# Patient Record
Sex: MALE | Race: WHITE | Employment: FULL TIME | URBAN - METROPOLITAN AREA
[De-identification: names, ages, dates, MRNs, and addresses within clinical notes are randomized per-mention and may not be internally consistent; named-entity substitution may affect disease eponyms.]

---

## 2017-09-21 ENCOUNTER — OFFICE VISIT - HEALTHEAST (OUTPATIENT)
Dept: FAMILY MEDICINE | Facility: CLINIC | Age: 27
End: 2017-09-21

## 2017-09-21 DIAGNOSIS — J45.20 INTERMITTENT ASTHMA, UNCOMPLICATED: ICD-10-CM

## 2017-11-02 ENCOUNTER — RECORDS - HEALTHEAST (OUTPATIENT)
Dept: ADMINISTRATIVE | Facility: OTHER | Age: 27
End: 2017-11-02

## 2017-11-02 ENCOUNTER — AMBULATORY - HEALTHEAST (OUTPATIENT)
Dept: FAMILY MEDICINE | Facility: CLINIC | Age: 27
End: 2017-11-02

## 2017-11-02 ENCOUNTER — OFFICE VISIT - HEALTHEAST (OUTPATIENT)
Dept: FAMILY MEDICINE | Facility: CLINIC | Age: 27
End: 2017-11-02

## 2017-11-02 DIAGNOSIS — Z23 NEED FOR INFLUENZA VACCINATION: ICD-10-CM

## 2017-11-02 DIAGNOSIS — J02.0 STREP PHARYNGITIS: ICD-10-CM

## 2017-11-02 DIAGNOSIS — J02.9 SORE THROAT: ICD-10-CM

## 2018-07-31 ENCOUNTER — OFFICE VISIT - HEALTHEAST (OUTPATIENT)
Dept: FAMILY MEDICINE | Facility: CLINIC | Age: 28
End: 2018-07-31

## 2018-07-31 DIAGNOSIS — R10.32 BILATERAL GROIN PAIN: ICD-10-CM

## 2018-07-31 DIAGNOSIS — R10.31 BILATERAL GROIN PAIN: ICD-10-CM

## 2018-10-20 ENCOUNTER — COMMUNICATION - HEALTHEAST (OUTPATIENT)
Dept: FAMILY MEDICINE | Facility: CLINIC | Age: 28
End: 2018-10-20

## 2018-11-13 ENCOUNTER — OFFICE VISIT - HEALTHEAST (OUTPATIENT)
Dept: FAMILY MEDICINE | Facility: CLINIC | Age: 28
End: 2018-11-13

## 2018-11-13 DIAGNOSIS — J45.20 INTERMITTENT ASTHMA, UNCOMPLICATED: ICD-10-CM

## 2019-11-08 ENCOUNTER — OFFICE VISIT - HEALTHEAST (OUTPATIENT)
Dept: FAMILY MEDICINE | Facility: CLINIC | Age: 29
End: 2019-11-08

## 2019-11-08 DIAGNOSIS — Z23 NEED FOR INFLUENZA VACCINATION: ICD-10-CM

## 2019-11-08 DIAGNOSIS — S89.92XA KNEE INJURY, LEFT, INITIAL ENCOUNTER: ICD-10-CM

## 2019-11-08 ASSESSMENT — MIFFLIN-ST. JEOR: SCORE: 1823.55

## 2020-01-23 ENCOUNTER — OFFICE VISIT - HEALTHEAST (OUTPATIENT)
Dept: FAMILY MEDICINE | Facility: CLINIC | Age: 30
End: 2020-01-23

## 2020-01-23 DIAGNOSIS — S39.012A LOW BACK STRAIN, INITIAL ENCOUNTER: ICD-10-CM
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
## 2020-02-25 ENCOUNTER — OFFICE VISIT - HEALTHEAST (OUTPATIENT)
Dept: FAMILY MEDICINE | Facility: CLINIC | Age: 30
End: 2020-02-25

## 2020-02-25 DIAGNOSIS — N50.812 LEFT TESTICULAR PAIN: ICD-10-CM

## 2020-02-25 DIAGNOSIS — I86.1 LEFT VARICOCELE: ICD-10-CM

## 2020-06-23 ENCOUNTER — COMMUNICATION - HEALTHEAST (OUTPATIENT)
Dept: FAMILY MEDICINE | Facility: CLINIC | Age: 30
End: 2020-06-23

## 2020-06-23 ENCOUNTER — OFFICE VISIT - HEALTHEAST (OUTPATIENT)
Dept: FAMILY MEDICINE | Facility: CLINIC | Age: 30
End: 2020-06-23

## 2020-06-23 DIAGNOSIS — J45.20 INTERMITTENT ASTHMA, UNCOMPLICATED: ICD-10-CM

## 2020-06-23 DIAGNOSIS — Z11.4 ENCOUNTER FOR SCREENING FOR HIV: ICD-10-CM

## 2020-06-23 RX ORDER — LORATADINE 10 MG/1
10 TABLET ORAL DAILY
Status: SHIPPED | COMMUNITY
Start: 2020-06-23

## 2020-06-23 RX ORDER — ALBUTEROL SULFATE 90 UG/1
AEROSOL, METERED RESPIRATORY (INHALATION)
Qty: 1 INHALER | Refills: 1 | Status: SHIPPED | OUTPATIENT
Start: 2020-06-23

## 2021-03-25 ENCOUNTER — COMMUNICATION - HEALTHEAST (OUTPATIENT)
Dept: FAMILY MEDICINE | Facility: CLINIC | Age: 31
End: 2021-03-25

## 2021-05-28 ASSESSMENT — ASTHMA QUESTIONNAIRES
ACT_TOTALSCORE: 22
ACT_TOTALSCORE: 22

## 2021-05-31 VITALS — BODY MASS INDEX: 27.87 KG/M2 | WEIGHT: 192.5 LBS

## 2021-05-31 VITALS — WEIGHT: 192 LBS | BODY MASS INDEX: 27.8 KG/M2

## 2021-06-01 VITALS — BODY MASS INDEX: 27.07 KG/M2 | WEIGHT: 187 LBS

## 2021-06-02 VITALS — WEIGHT: 193.25 LBS | BODY MASS INDEX: 27.98 KG/M2

## 2021-06-03 VITALS
HEIGHT: 70 IN | BODY MASS INDEX: 27.06 KG/M2 | WEIGHT: 189 LBS | OXYGEN SATURATION: 98 % | HEART RATE: 68 BPM | RESPIRATION RATE: 16 BRPM

## 2021-06-03 NOTE — PROGRESS NOTES
Impression:  Left knee ligament laxity, rule out ACL and lateral collateral ligament injuries or meniscal injury    Plan:  Refer to orthopedics, avoid traumatic exercise but he can do cycling for exercise until then, quadriceps strengthening exercises, I do not think he needs crutches or brace at this point appears to be a chronic situation      Chief Complaint:  Chief Complaint   Patient presents with     Knee Pain         HPI:   Dami Rojas is a 29 y.o. male who presents to this clinic for the evaluation of a feeling of instability in his left knee.  Patient has noticed over the past several days that when he tries to fully extend his left knee, it feels unstable.  He does not recall any specific injury but he is active in several sports including rockclimbing and playing football.  No swelling or pain in the knee.  No other joint complaints    PMH:   No past medical history on file.  Past Surgical History:   Procedure Laterality Date     OSTEOCHONDROMA EXCISION  2008         ROS:  All other systems negative    Meds:    Current Outpatient Medications:      albuterol (PROAIR HFA) 90 mcg/actuation inhaler, INHALE ONE OR TWO PUFFS BY MOUTH EVERY FOUR TO SIX HOURS AS NEEDED ., Disp: 1 Inhaler, Rfl: 11     FEXOFENADINE HCL (ALLEGRA ORAL), Take 1 tablet by mouth daily. , Disp: , Rfl:         Social:  Social History     Socioeconomic History     Marital status: Single     Spouse name: Not on file     Number of children: Not on file     Years of education: Not on file     Highest education level: Not on file   Occupational History     Not on file   Social Needs     Financial resource strain: Not on file     Food insecurity:     Worry: Not on file     Inability: Not on file     Transportation needs:     Medical: Not on file     Non-medical: Not on file   Tobacco Use     Smoking status: Never Smoker     Smokeless tobacco: Never Used   Substance and Sexual Activity     Alcohol use: Yes     Drinks per session: 1 or 2      Comment: Moderately     Drug use: No     Sexual activity: Yes     Partners: Female     Birth control/protection: OCP   Lifestyle     Physical activity:     Days per week: Not on file     Minutes per session: Not on file     Stress: Not on file   Relationships     Social connections:     Talks on phone: Not on file     Gets together: Not on file     Attends Taoism service: Not on file     Active member of club or organization: Not on file     Attends meetings of clubs or organizations: Not on file     Relationship status: Not on file     Intimate partner violence:     Fear of current or ex partner: Not on file     Emotionally abused: Not on file     Physically abused: Not on file     Forced sexual activity: Not on file   Other Topics Concern     Not on file   Social History Narrative     Not on file         Physical Exam:  Left knee shows bony prominence of the tibial tuberosity.  There is no effusion and there is normal range of motion.  There is ligament laxity on Lachman testing of the ACL and on varus stress testing of the lateral collateral ligament of the knee.  The MCL is stable on valgus stress testing.  Right knee is normal    Results:    No results found for this or any previous visit (from the past 24 hour(s)).    No results found.      Jose Brown MD

## 2021-06-04 VITALS
BODY MASS INDEX: 27.84 KG/M2 | DIASTOLIC BLOOD PRESSURE: 76 MMHG | HEART RATE: 52 BPM | RESPIRATION RATE: 12 BRPM | TEMPERATURE: 98.1 F | OXYGEN SATURATION: 98 % | SYSTOLIC BLOOD PRESSURE: 131 MMHG | WEIGHT: 194 LBS

## 2021-06-04 VITALS
BODY MASS INDEX: 26.93 KG/M2 | HEART RATE: 59 BPM | DIASTOLIC BLOOD PRESSURE: 76 MMHG | TEMPERATURE: 97.9 F | SYSTOLIC BLOOD PRESSURE: 132 MMHG | OXYGEN SATURATION: 99 % | WEIGHT: 187.7 LBS

## 2021-06-09 NOTE — PROGRESS NOTES
"Dami Rojas is a 29 y.o. male who is being evaluated via a billable telephone visit.      The patient has been notified of following:     \"This telephone visit will be conducted via a call between you and your physician/provider. We have found that certain health care needs can be provided without the need for a physical exam.  This service lets us provide the care you need with a short phone conversation.  If a prescription is necessary we can send it directly to your pharmacy.  If lab work is needed we can place an order for that and you can then stop by our lab to have the test done at a later time.    Telephone visits are billed at different rates depending on your insurance coverage. During this emergency period, for some insurers they may be billed the same as an in-person visit.  Please reach out to your insurance provider with any questions.    If during the course of the call the physician/provider feels a telephone visit is not appropriate, you will not be charged for this service.\"    Patient has given verbal consent to a Telephone visit? Yes         What phone number would you like to be contacted at? [phone]    Patient would like to receive their AVS by AVS Preference: Melani.    Chief Complaint   Patient presents with     Follow-up     medication-albuterol; current dose is working good.     Dami says he is generally doing well given the times.     He was to have surgery through [facility] for a varicocele, but this was cancelled because of Covid.  He says the varicocele has improved since he started wearing briefs. He understands his surgeons team will contact him again when they are ready to do elective surgeries    Asthma  - Dami mostly does not notice his asthma. Used Advair as a kid, but grew out of need for controller medication.  Currently uses albuterol maybe once a week only with vigorous exercise. If he comes home after a log run he might wheeze a little. Sometimes he'll take his " inhaler before he anticipates a long run. Allergies might contribute too - he notices symptoms more when he spends time in the [facility].     For allergies his switches between Allegra and loratadine yearly  He has never gotten tested for allergies, but he know that ragweed drives him crazy  ACT Total Score: 22 (6/23/2020  2:02 PM)    Objective: Dami sounds well, no dyspnea     Assessment/Plan:    1. Intermittent asthma, uncomplicated  Well controlled on:   - albuterol (PROAIR HFA) 90 mcg/actuation inhaler; INHALE ONE OR TWO PUFFS BY MOUTH EVERY FOUR TO SIX HOURS AS NEEDED  Dispense: 1 Inhaler; Refill: 1    2. Encounter for screening for HIV  - HIV Antigen/Antibody Screening [facility]; Standing    Also   - will let me know if he needs a preop   - Sent link to [facility]    Return in about 1 year (around 6/23/2021) for or earlier as needed.      Phone call duration: 10 minutes  2:14 - 2:24    Gena Parks MD

## 2021-06-13 NOTE — PROGRESS NOTES
"Assessment and Plan    1. Intermittent asthma, uncomplicated  Well controlled  - albuterol (PROAIR HFA) 90 mcg/actuation inhaler; INHALE ONE OR TWO PUFFS BY MOUTH EVERY FOUR TO SIX HOURS AS NEEDED  Dispense: 1 Inhaler; Refill: 11  - montelukast (SINGULAIR) 10 mg tablet; Take 1 tablet (10 mg total) by mouth at bedtime.  Dispense: 90 tablet; Refill: 3    Gena Parks MD     -------------------------------------------    Chief Complaint   Patient presents with     Asthma     needs meds to be refilled.     Dami needs his Singulair and albuterol refilled. In the winter the allergies aren't bad - would only take Singulair before exercise.  When it's allergy season Singulair helps more the allergies, and he doesn't take a typical non-sedating antihistamine. Typically only takes albuterol once a week, before or after exercise.  He does not feel short of breath with usual activities, only with more intense physical exercise.  At one point he was on Advair but it was \"really expensive so I stopped;\" - he does not seem to need it.  He has never had to use albuterol more than once daily    He is training now - longest run is 8 miles.  He is affected by the quality alerts - these bring him more sinus drainage and mucous rather than dyspnea    In the past 4 weeks, how much of the time did your asthma keep you from getting as much done at work, school, or at home?: 5 (9/21/2017 11:00 AM)  During the past 4 weeks, how often have you had shortness of breath?: 4 (9/21/2017 11:00 AM)  During the past 4 weeks, how often did your asthma symptoms (wheezing, coughing, shortness of breath, chest tightness or pain) wake you up at night or earlier in the morning?: 5 (9/21/2017 11:00 AM)  During the past 4 weeks, how often have you used your rescue inhaler or nebulizer medication (such as albuterol)?: 4 (9/21/2017 11:00 AM)  How would you rate your asthma control during the past 4 weeks?: 5 (9/21/2017 11:00 AM)  ACT Total Score: 23 " (9/21/2017 11:00 AM)  In the past 12 months, have you visited the emergency room due to your asthma?: No (9/21/2017 11:00 AM)  In the past 12 months, have you been hospitalized due to your asthma?: No (9/21/2017 11:00 AM)      Patient Active Problem List   Diagnosis     Allergic Rhinitis     Intermittent asthma       Current Outpatient Prescriptions on File Prior to Visit   Medication Sig Dispense Refill     FEXOFENADINE HCL (ALLEGRA ORAL) Take 1 tablet by mouth daily.        mometasone (ASMANEX HFA) 200 mcg/actuation HFAA Inhale 400 mcg 2 (two) times a day. 1 Inhaler 0     No current facility-administered medications on file prior to visit.            Health Maintenance Due   Topic Date Due     INFLUENZA VACCINE RULE BASED (1) 08/01/2017     Health Maintenance reviewed - he will get this elsewhere    History   Smoking Status     Never Smoker   Smokeless Tobacco     Never Used       History   Alcohol Use     Yes     Comment: Moderately       Review of Systems - feeling well overall    Vitals:    09/21/17 1058   BP: 112/78   Pulse: (!) 48   Resp: 16   SpO2: 98%     Body mass index is 27.8 kg/(m^2).     EXAM:    General appearance - alert, well appearing, and in no distress  Chest - clear to auscultation, no wheezes, rales or rhonchi, symmetric air entry  Heart - normal rate, regular rhythm, normal S1, S2, no murmurs, rubs, clicks or gallops

## 2021-06-13 NOTE — PROGRESS NOTES
Assessment & Plan   1. Strep pharyngitis:  Positive strep screen. Allergy to cephalexin though has previously tolerated penicillins. Advised to avoid contact with others, complete full course of antibiotic.  He inquires about going for a run tomorrow and I see no reason he cannot do this if cough is not worsening and no wheezing.  Listen to body and rest as needed  - Rapid Strep A Screen-Throat  - amoxicillin (AMOXIL) 500 MG tablet; Take 1 tablet (500 mg total) by mouth 2 (two) times a day for 10 days. May substitute capsules  Dispense: 20 tablet; Refill: 0    2. Need for influenza vaccination  - Influenza, Seasonal Quad, Preservative Free 36+ Months    Natasha Daniel, KARMA    Subjective   Chief Complaint:  Sore Throat (sore throat 2x days ); Nasal Congestion; and Cough    HPI:   Dami Rojas is a 27 y.o. male who presents for evaluation of sore throat, cough.    He is a patient of Dr. Parks.  PMH notable for intermittent asthma.     He complains of sore throat and nasal congestion x2 days.  Cough that just began this morning.  He states he has a workmate who has been ill with strep throat.  Cough is dry, mild.  He denies wheezing or shortness of breath. On mometasone and Singulair for asthma.      PMH:   Patient Active Problem List   Diagnosis     Allergic Rhinitis     Intermittent asthma       No past medical history on file.    Current Medications:   Current Outpatient Prescriptions on File Prior to Visit   Medication Sig Dispense Refill     albuterol (PROAIR HFA) 90 mcg/actuation inhaler INHALE ONE OR TWO PUFFS BY MOUTH EVERY FOUR TO SIX HOURS AS NEEDED 1 Inhaler 11     FEXOFENADINE HCL (ALLEGRA ORAL) Take 1 tablet by mouth daily.        mometasone (ASMANEX HFA) 200 mcg/actuation HFAA Inhale 400 mcg 2 (two) times a day. 1 Inhaler 0     montelukast (SINGULAIR) 10 mg tablet Take 1 tablet (10 mg total) by mouth at bedtime. 90 tablet 3     No current facility-administered medications on file prior to visit.         Allergies:  is allergic to cephalexin.    SH/FH:  Social History and Family History reviewed and updated.   Tobacco Status:  He  reports that he has never smoked. He has never used smokeless tobacco.    Review of Systems:  A complete head to toe ROS is negative unless otherwise noted in HPI    Objective     Vitals:    11/02/17 1321   BP: 138/74   Patient Site: Left Arm   Patient Position: Sitting   Cuff Size: Adult Regular   Pulse: (!) 59   Resp: 16   Temp: 99.2  F (37.3  C)   TempSrc: Oral   SpO2: 98%   Weight: 192 lb 8 oz (87.3 kg)     Wt Readings from Last 3 Encounters:   11/02/17 192 lb 8 oz (87.3 kg)   09/21/17 192 lb (87.1 kg)   06/17/16 188 lb 12 oz (85.6 kg)       Physical Exam:  GENERAL: Alert, well-appearing male  EYES: Conjunctiva pink, sclera white, no exudates.   EARS: TMs pearly grey, no bulging, redness, retraction.   NOSE: Nares patent, clear discharge  MOUTH: Pharynx moist, pink without exudate. No tonsillar enlargement  NECK: No lymphadenopathy.   CV: Regular rate and rhythm without murmurs, rubs or gallops.  RESP: Lung sounds clear, no wheezing or rhonchi    Labs:    No results found for this or any previous visit (from the past 168 hour(s)).

## 2021-06-16 PROBLEM — Z86.79 HISTORY OF VARICOCELE: Status: ACTIVE | Noted: 2020-04-19
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
## 2021-06-17 NOTE — PATIENT INSTRUCTIONS - HE
Patient Instructions by Jose Brown MD at 11/8/2019  2:20 PM     Author: Jose Brown MD Service: -- Author Type: Physician    Filed: 11/8/2019  3:25 PM Encounter Date: 11/8/2019 Status: Signed    : Jose Brown MD (Physician)         Patient Education     ACL and PCL (Knee Ligament Injury)  Follow up with the referral doctor or healthcare provider, or as directed.  If an X-ray, CT scan, or MRI scan was done today, you will be notified of any new findings that may affect your care.  The knee is a hinge joint. It joins the thigh and shin bones. There are 2 ligaments inside the knee that protect the joint from too much forward and backward movement. The ACL, or anterior cruciate ligament, keeps the knee from sliding forward. The PCL, or posterior cruciate ligament, keeps the knee from sliding backward.  An ACL or PCL injury occurs when the ligament has been torn. The tear may be partial or complete. Symptoms include knee swelling, pain, and the joint becoming unstable. Some people hear a snap or pop at the time of an ACL injury.  ACL injuries are the most common. They often occur during quick changes in direction while playing sports. An ACL injury may happen at the same time as a cartilage injury of the knee.  The less common PCL injury occurs if the knee bends backwards, or with a direct blow to a bent knee (such as hitting the dashboard with your knee during a car accident).  The pain and swelling of a cruciate ligament injury can last several weeks. It may take 3 to 4 months for the ligament to fully heal.  An ACL or PCL diagnosis can often be made by physical exam. But pain and swelling right after the injury may limit the exam. An MRI may be used to confirm the diagnosis.  Early treatment includes resting the joint, wearing a splint to reduce movement, and using ice to reduce swelling and pain. You may use over-the-counter pain medicine, unless another pain medicine was prescribed.  Physical therapy will help you to regain joint and leg strength. Surgery is sometimes needed to treat a more severe cruciate ligament injury.     Home care    Stay off the injured leg as much as possible until you can walk on it without pain. If you have a lot of pain with walking, crutches or a walker may be prescribed. These can be rented or purchased at many pharmacies and surgical or orthopedic supply stores. Follow your doctor's advice about when to begin bearing weight on that leg.    Keep your leg raised to reduce pain and swelling. When sleeping, place a pillow under the injured leg. When sitting, support the injured leg so it is level with your waist. This is very important during the first 48 hours.    Apply an ice pack over the injured area for 15 to 20 minutes. Do this every 3 to 6 hours on the first day for pain relief. Keep using ice 3 to 4 times a day until the pain and swelling goes away. To make an ice pack, put ice cubes in a plastic bag that seals at the top. Then wrap the bag in a clean, thin towel or cloth. You can place the ice pack directly over a splint. As the ice melts, be careful that the splint doesnt get wet. If you have a hook-and-loop knee immobilizer, you can open this to put the ice pack directly to the knee. But always wrap the ice pack in a towel or cloth first. Never put it directly on your skin.    If you were given a plaster or fiberglass splint, keep it dry at all times. Bathe with your splint out of the water, protected with a large plastic bag. Seal the top end with a rubber band. If a fiberglass splint gets wet, you can dry it with a hair dryer on a cool setting. If you have a hook-and-loop fastening knee immobilizer, you can remove this to bathe, unless told otherwise.    Your provider may prescribe physical therapy. The physical therapist will show you range of motion exercises to practice at home.    You may use over-the-counter pain medicine to control pain, unless  another medicine was prescribed. If you have chronic liver or kidney disease or ever had a stomach ulcer or GI (gastrointestinal) bleeding, talk with your provider before using these medicines.    Learn proper techniques for playing sports or exercising. Training programs can teach athletes how to reduce stress to the ACL. Protective bracing during sports may be helpful to prevent re-injury.    Check with your provider before returning to sports or full work duties.  Follow-up care  Follow up with the referral doctor or healthcare provider, or as advised.  If an X-ray, CT scan, or MRI scan was done today, you will be notified of any new findings that may affect your care.  When to seek medical advice  Call your healthcare provider right away if any of these occur:    Pain or swelling becomes worse    Swelling, redness, or pain develop in the calf or thigh    The knee becomes more unstable, giving out often or locking up  Call 911  Call 911 if you have:     Chest pain     Shortness of breath, with or without chest pain or discomfort    Weakness or numbness in the arm, leg, or face.  Especially if it is only on 1 side    Sudden confusion or trouble speaking    Sudden severe headache with no known cause  Date Last Reviewed: 11/24/2015 2000-2017 [employer]. [address] [place] [address]. All rights reserved. This information is not intended as a substitute for professional medical care. Always follow your healthcare professional's instructions.           Patient Education     Knee Pain with Possible Torn Meniscus    The meniscus is a tough cartilage pad that cushions the inside of the knee joint. It helps absorb the shock from movement. It also spreads the weight of your body evenly across the knee joint. This prevents excess wear and tear to the bones of that joint.  The most common causes of meniscal tears are injuries, especially related to sports and degenerative disease that happens  with aging.  A meniscus tear commonly happens during a twisting injury when the knee is bent. This causes pain, swelling, reduced movement of the knee, and trouble walking. There may be popping, clicking, joint locking or inability to completely straighten the knee. Ligaments of the knee may also be injured.  A torn meniscus is diagnosed by physical exam and X-rays. In the case of a severe injury, the knee may be too painful to examine fully. A more accurate exam can be done after the initial swelling goes down. An MRI may be ordered to make a final diagnosis.  If your healthcare provider suspects a meniscal injury, you will treat your knee with ice and rest and preventing movement of the knee. A splint or knee brace that keeps your leg straight may be put on to protect the joint. Depending on the severity of the injury, surgery may be needed. A cartilage injury may take 4-12 weeks to heal depending on how bad it is.  Home care    Stay off the injured leg as much as possible until you can walk on it without pain. If you have a lot of pain when walking, crutches or a walker may be prescribed. (These can be rented or bought at many pharmacies and surgical or orthopedic supply stores). Follow your healthcare provider's advice about when to begin putting weight on that leg.    Keep your leg elevated to reduce pain and swelling. When sleeping, place a pillow under the injured leg. When sitting, support the injured leg so it is level with your waist. This is very important during the first 48 hours.    Apply an ice pack over the injured area for 15 to 20 minutes every 3 to 6 hours. You should do this for the first 24 to 48 hours. You can make an ice pack by filling a plastic bag that seals at the top with ice cubes and then wrapping it with a thin towel. Continue to use ice packs for relief of pain and swelling as needed. As the ice melts, be careful to avoid getting your wrap, splint, or cast wet. After 48 hours, apply  heat (warm shower or warm bath) for 15 to 20 minutes several times a day, or alternate ice and heat. You can place the ice pack directly over the splint. If you have to wear a hook-and-loop knee brace, you can open it to apply the ice pack, or heat, directly to the knee. Never put ice directly on the skin. Always wrap the ice in a towel or other type of cloth.    You may use over-the-counter pain medicine to control pain, unless another pain medicine was prescribed. If you have chronic liver or kidney disease or ever had a stomach ulcer, talk with your healthcare provider before using these medicines.    If you were given a splint, keep it dry at all times. Bathe with your splint out of the water. Protect it with a large plastic bag that is rubber-banded at the top end. If a fiberglass splint gets wet, you can dry it with a hair dryer. If you have a hook-and-loop knee brace, you can remove this to bathe, unless told otherwise.    Check with your healthcare provider before returning to sports or full work duties.  Follow-up care  Follow up with your healthcare provider, or as advised. This is usually within 1 to 2 weeks. Further testing may be required to check the extent of your injury.  If X-rays were taken, you will be told of any new findings that may affect your care.  Call 911  Call 911 if you have:     Shortness of breath    Chest pain  When to seek medical advice  Call your healthcare provider right away if any of these occur:    Toes or foot gets swollen, cold, blue, numb, or tingly    Pain or swelling spreads over the knee or calf    Warmth or redness appears over the knee or calf    Fever of 100.4 F (38 C) or higher, or as directed by your healthcare provider  Date Last Reviewed: 11/23/2015 2000-2017 [employer]. [address] [place] [address], [place] [address]. All rights reserved. This information is not intended as a substitute for professional medical care. Always follow your healthcare  professional's instructions.

## 2021-06-18 NOTE — PATIENT INSTRUCTIONS - HE
Patient Instructions by Jose Malhotra PA-C at 2/25/2020  7:10 AM     Author: Jose Malhotra PA-C Service: -- Author Type: Physician Assistant    Filed: 2/25/2020  7:40 AM Encounter Date: 2/25/2020 Status: Addendum    : Jose Malhotra PA-C (Physician Assistant)    Related Notes: Original Note by Jose Malhotra PA-C (Physician Assistant) filed at 2/25/2020  7:38 AM       Go to the emergency room and be seen for evaluation and treatment.      Patient Education     Testicular Pain, Unclear Cause  You have had pain in one or both testicles. Based on your exam today, the exact cause of your pain is not certain. But your condition does not appear to be dangerous. Testicles are very sensitive. Even a small injury can cause quite a bit of pain. Other possible causes of testicular pain include kidney stones, cysts, mumps, inflammatory conditions, chronic conditions, hernia, infection, and a twisted testicle.  Certain tests may be done to rule out an underlying problem causing the pain. Nothing conclusive was found today. Most likely, the pain will go away on its own. If it doesnt, you may need more tests.    Home care  Medicine may be prescribed to help relieve pain and swelling. This may be an over-the-counter pain reliever or prescription pain medication. Take all medicine as directed.  The following are general care guidelines:    To relieve pain and swelling, apply an ice pack wrapped in a thin towel for 10 minutes at a time. Continue this on and off for 1 to 2 days.    When lying down, place a small rolled towel under your scrotum. When moving around, wear a jockstrap (athletic supporter) or supportive underwear. These will help support and protect your testicles.    If it hurts to walk, walk as little as possible until you feel better.    Avoid strenuous activity until you feel better.    Do not have sex until you feel better.    If you have severe pain in the testicle, seek care right away. Delay may lead to  permanent loss of the testicles function.  Follow-up care  Follow up with your healthcare provider, or as advised.  When to seek medical advice  Call your healthcare provider right away if any of these occur:    Fever of 100.4 F (38 C) or higher    Worsening of the pain or severe pain    Swelling of the testicle or scrotum    A lump in the scrotum    Warm and red scrotum (signs of infection)    Nausea and vomiting    Pain or swelling in abdomen    Trouble urinating    Numbness or weakness in the leg    Shrinking of the testicle    Blood in your urine  Date Last Reviewed: 10/1/2016    1121-5409 [employer]. [address] [place] [address]. All rights reserved. This information is not intended as a substitute for professional medical care. Always follow your healthcare professional's instructions.           Patient Education     Varicocele  A varicocele ([id number]) is a swelling in the veins above the testicles. It is similar to a varicose vein in the legs. The swelling happens when too much blood collects in the veins. It most often occurs around the left testicle. A varicocele may cause the sac of skin covering the testicles (the scrotum) to have a bluish color. It may also cause an achy or heavy feeling in the scrotum. The pain may be worse later in the day or after you have been standing for a long time. Some varicoceles can be seen all the time. Others can be seen only when you are standing. Or they may only be seen when a Valsalva maneuver is done. This means bearing down in your belly (abdomen) to increase pressure.  In most cases, a varicocele is not serious and doesn't need to be treated. But it is linked to being unable to have a child (infertility). If you and your partner are trying to have a baby, talk with your healthcare provider about your options.    No medicines are available to fix this condition. Surgery can be done to close off the enlarged veins. A varicocele is not  serious. And all surgery has risks. So you and your healthcare provider may consider surgery only if:    The pain becomes worse or you have new symptoms    The testicle shrinks (atrophy)    You want to try to improve your fertility  Home care  To help lessen discomfort, aching, or pain:    Wear a jockstrap or snug underwear    Take an over-the-counter pain reliever, such as ibuprofen  Follow-up care  Follow up with your healthcare provider, or as advised. Schedule regular exams with your provider so the varicocele can be watched. Be sure to keep all your appointments. Tell your provider if you notice any changes in your testicles or scrotum.   When to seek medical advice  Call your healthcare provider right away if any of these occur:    Increasing pain in your scrotum    Trouble urinating    A lump in the testicle    A bulge in the groin area appears or gets bigger  Date Last Reviewed: 6/1/2016 2000-2017 [employer]. [address] [place] [address], [place] [address]. All rights reserved. This information is not intended as a substitute for professional medical care. Always follow your healthcare professional's instructions.

## 2021-06-19 NOTE — PROGRESS NOTES
"  Assessment:       Groin pain, bilateral    Medical Decision Making  Pain is likely muscular irritation of the hip flexors due to marathon training. Initial concern for hernias ruled out by absence of palpable mass during physical exam with valsalva maneuver with no bowel movement difficulties, fevers, nausea, vomiting, or abdominal tenderness. Did instruct patient that there may be a small unidentifiable hernia and to monitor symptoms closely. If no improvement, may consider an ultrasound to verify no hernias.       Plan:       1. Provided reassurance  2. Rest and cold compresses  3. Discussed signs of worsening symptoms  4. Follow-up as needed       Patient Instructions   You were seen today for abdominal and groin pain. There were no signs of an infection or hernias present on exam today. Monitor for signs of worsening symptoms. Otherwise recommend rest and icing. May also use tylenol or ibuprofen.    Reasons to return for re-evaluation:  - Fever of 100.4 or higher  - Difficulties with urination or passing a bowel movement  - Worsening pain and discomfort  - Notice an abnormal bump    Subjective:       Dami Rojas is a 27 y.o. male here for evaluation of groin pain. Onset was 1 month ago. Patient describes pain in RLQ abdomen near the pelvic bone that hurts with running, described as 3/10 in severity, dull, and does not radiate. He also notes pain in the left groin that is the same 3/10 in severity, dull and \"pulling\" in character, and does not radiate. The left groin does not irritate during running. No history of abdominal surgeries. He denies fevers, nausea, vomiting, urinary symptoms such as frequency, dysuria, hematuria, and urethral discharge and troubles passing his bowel movements. Patient notes he has been training for a marathon.    The following portions of the patient's history were reviewed and updated as appropriate: allergies, current medications and problem list.    Review of Systems  Pertinent " items are noted in HPI.     Allergies  Allergies   Allergen Reactions     Cephalexin      Previously tolerated penicillins         Objective:       /78  Pulse (!) 48  Temp 98.2  F (36.8  C) (Oral)   Resp 12  Wt 187 lb (84.8 kg)  SpO2 (!) 47%  BMI 27.07 kg/m2  General appearance: alert, appears stated age, cooperative, no distress and non-toxic  Head: Normocephalic, without obvious abnormality, atraumatic  Back: no CVA tenderness  Lungs: clear to auscultation bilaterally and no rhonchi, rales, or wheezing  Heart: regular rate and rhythm, S1, S2 normal, no murmur, click, rub or gallop  Abdomen: soft, non-tender; bowel sounds normal; no masses,  no organomegaly  Male genitalia: tenderness to region between the left scrotum and inner thigh, no palpable masses, no masses when bearing down, no erythema, skin intact; varicocele of the left testicle, testicles non-tender, no urethral discharge  Extremities: extremities normal, atraumatic, no cyanosis or edema; FROM with negative obturator and psoas sign  Skin: Skin color, texture, turgor normal. No rashes or lesions

## 2021-06-20 ENCOUNTER — HEALTH MAINTENANCE LETTER (OUTPATIENT)
Age: 31
End: 2021-06-20

## 2021-06-20 NOTE — LETTER
Letter by Gena Parks MD at      Author: Gena Parks MD Service: -- Author Type: --    Filed:  Encounter Date: 6/23/2020 Status: (Other)       My Asthma Action Plan     Name: Dami Rojas   YOB: 1990  Date: 6/23/2020   My doctor: Gena Parks MD   My clinic: [facility]         My Rescue Medicine:   Albuterol (Proair/Ventolin/Proventil HFA) 2-4 puffs EVERY 4 HOURS as needed. Use a spacer if recommended by your provider.   My Asthma Severity:   Intermittent/Exercise Induced  Know your asthma triggers: exercise or sports             GREEN ZONE   Good Control    I feel good    No cough or wheeze    Can work, sleep and play without asthma symptoms     Take your asthma control medicine every day.     1. If exercise triggers your asthma, take your rescue medication    15 minutes before exercise or sports, and    During exercise if you have asthma symptoms  2. Spacer to use with inhaler: If you have a spacer, make sure to use it with your inhaler             YELLOW ZONE Getting Worse  I have ANY of these:    I do not feel good    Cough or wheeze    Chest feels tight    Wake up at night 1. Keep taking your Green Zone medications  2. Start taking your rescue medicine:    every 20 minutes for up to 1 hour. Then every 4 hours for 24-48 hours.  3. If you stay in the Yellow Zone for more than 12-24 hours, contact your doctor.  4. If you do not return to the Green Zone in 12-24 hours or you get worse, start taking your oral steroid medicine if prescribed by your provider.           RED ZONE Medical Alert - Get Help  I have ANY of these:    I feel awful    Medicine is not helping    Breathing getting harder    Trouble walking or talking    Nose opens wide to breathe     1. Take your rescue medicine NOW  2. If your provider has prescribed an oral steroid medicine, start taking it NOW  3. Call your doctor NOW  4. If you are still in the Red Zone after 20 minutes and you have not reached  your doctor:    Take your rescue medicine again and    Call 911 or go to the emergency room right away    See your regular doctor within 2 weeks of an Emergency Room or Urgent Care visit for follow-up treatment.          Annual Reminders:  Meet with Asthma Educator,  Flu Shot in the Fall, consider Pneumonia Vaccination for patients with asthma (aged 19 and older).    Pharmacy:   [facility] - [place], [place] - [address]  [address] [place] [address]  Phone: [phone] Fax: [phone]      Electronically signed by Gena Parks MD   Date: 06/23/20                      Asthma Triggers  How To Control Things That Make Your Asthma Worse    Triggers are things that make your asthma worse.  Look at the list below to help you find your triggers and what you can do about them.  You can help prevent asthma flare-ups by staying away from your triggers.      Trigger                                                          What you can do   Cigarette Smoke  Tobacco smoke can make asthma worse. Do not allow smoking in your home, car or around you.  Be sure no one smokes at a michelle day care or school.  If you smoke, ask your health care provider for ways to help you quit.  Ask family members to quit too.  Ask your health care provider for a referral to Quit Plan to help you quit smoking, or call 6-291-522-PLAN.     Colds, Flu, Bronchitis  These are common triggers of asthma. Wash your hands often.  Dont touch your eyes, nose or mouth.  Get a flu shot every year.     Dust Mites  These are tiny bugs that live in cloth or carpet. They are too small to see. Wash sheets and blankets in hot water every week.   Encase pillows and mattress in dust mite proof covers.  Avoid having carpet if you can. If you have carpet, vacuum weekly.   Use a dust mask and HEPA vacuum.   Pollen and Outdoor Mold  Some people are allergic to trees, grass, or weed pollen, or molds. Try to keep your windows  closed.  Limit time out doors when pollen count is high.   Ask you health care provider about taking medicine during allergy season.     Animal Dander  Some people are allergic to skin flakes, urine or saliva from pets with fur or feathers. Keep pets with fur or feathers out of your home.    If you cant keep the pet outdoors, then keep the pet out of your bedroom.  Keep the bedroom door closed.  Keep pets off cloth furniture and away from stuffed toys.     Mice, Rats, and Cockroaches  Some people are allergic to the waste from these pests.   Cover food and garbage.  Clean up spills and food crumbs.  Store grease in the refrigerator.   Keep food out of the bedroom.   Indoor Mold  This can be a trigger if your home has high moisture. Fix leaking faucets, pipes, or other sources of water.   Clean moldy surfaces.  Dehumidify basement if it is damp and smelly.   Smoke, Strong Odors, and Sprays  These can reduce air quality. Stay away from strong odors and sprays, such as perfume, powder, hair spray, paints, smoke incense, paint, cleaning products, candles and new carpet.   Exercise or Sports  Some people with asthma have this trigger. Be active!  Ask your doctor about taking medicine before sports or exercise to prevent symptoms.    Warm up for 5-10 minutes before and after sports or exercise.     Other Triggers of Asthma  Cold air:  Cover your nose and mouth with a scarf.  Sometimes laughing or crying can be a trigger.  Some medicines and food can trigger asthma.

## 2021-06-21 NOTE — PROGRESS NOTES
Assessment and Plan    1. Intermittent asthma, uncomplicated  Well controlled  - albuterol (PROAIR HFA) 90 mcg/actuation inhaler; INHALE ONE OR TWO PUFFS BY MOUTH EVERY FOUR TO SIX HOURS AS NEEDED .  Dispense: 1 Inhaler; Refill: 11    Orders Placed This Encounter   Procedures     Influenza, Seasonal Quad, Preservative Free 36+ Months       Return in about 1 year (around 11/13/2019).    Gena Parks MD     -------------------------------------------    Chief Complaint   Patient presents with     Follow-up     f/u med check      aDmi had tried Singulair for a year - felt like it didn't make a difference. He only gets symptoms when he is exercising in the cold.  If he doesn't take 2 puffs of albuterol, he can run but it's more annoying.  Sometimes he goes shortness of breath  if he has really bad allergies - both fall and spring - if he is in cabin somewhere and he forgets to take his antihistamine.       Used Advair as a kid, but grew out of need for controller medication.  Currently uses albuterol once a week only with vigorous exercise    Allergies - varies  - doesn't like zytrec - switched between claritin and allergra    Preventive   -No need for std testing   - OK for flu shot today      Patient Active Problem List   Diagnosis     Allergic Rhinitis     Intermittent asthma         Current Outpatient Medications:      albuterol (PROAIR HFA) 90 mcg/actuation inhaler, INHALE ONE OR TWO PUFFS BY MOUTH EVERY FOUR TO SIX HOURS AS NEEDED ., Disp: 1 Inhaler, Rfl: 11     FEXOFENADINE HCL (ALLEGRA ORAL), Take 1 tablet by mouth daily. , Disp: , Rfl:         There are no preventive care reminders to display for this patient.  Health Maintenance reviewed -     Social History     Tobacco Use   Smoking Status Never Smoker   Smokeless Tobacco Never Used       Social History     Substance and Sexual Activity   Alcohol Use Yes     Drinks per session: 1 or 2    Comment: Moderately         Vitals:    11/13/18 1349   BP: 138/72    Pulse: (!) 54   Resp: 16   SpO2: 98%     Body mass index is 27.98 kg/m .     EXAM:    General appearance - alert, well appearing, and in no distress  Mental status - normal mood, behavior, speech, dress, motor activity, and thought processes  Chest - clear to auscultation, no wheezes, rales or rhonchi, symmetric air entry  Heart - normal rate, regular rhythm, normal S1, S2, no murmurs, rubs, clicks or gallops

## 2021-06-28 NOTE — PROGRESS NOTES
Progress Notes by Jose Malhotra PA-C at 2/25/2020  7:10 AM     Author: Jose Malhotra PA-C Service: -- Author Type: Physician Assistant    Filed: 2/25/2020  8:26 AM Encounter Date: 2/25/2020 Status: Signed    : Jose Malhotra PA-C (Physician Assistant)       Subjective:      Patient ID: Dami Rojas is a 29 y.o. male.    Chief Complaint:    HPI     Dami Rojas is a 29 y.o. male with a longstanding chronic history of varicocele since age 15 who presents today complaining of left testicular pain since yesterday.  Patient did not have any precipitating event such as athletic activity running jumping or twisting bending or sexual intercourse last night.  Patient states that his left hemiscrotum has been more swollen than usual.  Is also more painful with movement or touching the scrotum or testicle.  He does not have signs or symptoms of hernia on the left side.  He has had no fever chills night sweats fatigue, he is  has one sexual partner and is not concerned for STDs.  Patient does not have pain at rest.  He says that his 1 out of 10 but it is a 7 out of 10 when he moves or touches the scrotum or testicle.  Additionally there is no urinary symptoms gross hematuria flank or back pain penile discharge, lesions on the scrotum or penis, arthralgias or fatigue.      No past medical history on file.    Past Surgical History:   Procedure Laterality Date   ? OSTEOCHONDROMA EXCISION  2008       Family History   Problem Relation Age of Onset   ? No Medical Problems Mother    ? Crohn's disease Father    ? Gallbladder disease Sister    ? Breast cancer Maternal Grandmother    ? No Medical Problems Paternal Grandmother    ? Lung disease Paternal Grandfather         work-related       Social History     Tobacco Use   ? Smoking status: Never Smoker   ? Smokeless tobacco: Never Used   Substance Use Topics   ? Alcohol use: Yes     Drinks per session: 1 or 2     Comment: Moderately   ? Drug use: No       Review of  Systems  As above in HPI, otherwise balance of Review of Systems are negative.    Objective:     /76 (Patient Site: Right Arm, Patient Position: Sitting, Cuff Size: Adult Regular)   Pulse (!) 59   Temp 97.9  F (36.6  C) (Oral)   Wt 187 lb 11.2 oz (85.1 kg)   SpO2 99%   BMI 26.93 kg/m      Physical Exam  General: Patient is uncomfortable when standing or moving  HEENT: Head is normocephalic atraumatic   eyes are PERRL EOMI sclera anicteric   LUNGS: Clear to auscultation bilaterally  HEART: Regular rate and rhythm  Skin: Without rash non-diaphoretic  : Normal circumcised phallus testicles are descended bilaterally the left side of the scrotum has a very large varicocele with a characteristic of a large bag of worms on the cord structure.  He also has tenderness over the epididymis and on the testicle itself.  There is a large palpated cyst or dilation from the varicocele.  Additionally the testicle itself is somewhat tender and there is pain on the scrotal wall with a small cyst.     Assessment:     Procedures    The primary encounter diagnosis was Left varicocele. A diagnosis of Left testicular pain was also pertinent to this visit.    Plan:     1. Left varicocele     2. Left testicular pain         Am concerned that the patient had significant pain that started last night.  It does not sound like a description of possible testicular torsion, however I do want him to have evaluation in a timely manner and he is being sent to the emergency room.  Patient was evaluated and referred to the emergency room very shortly after being roomed.  The longstanding chronic varicocele may be involved it may also be epididymitis or possibly testicular pain.  Raman will help to rule out other diagnoses or make a better diagnosis.  Patient will present to the emergency room for definitive evaluation and treatment.  Called and gave report to emergency room staff.    Patient Instructions   Go to the emergency room and be  seen for evaluation and treatment.      Patient Education     Testicular Pain, Unclear Cause  You have had pain in one or both testicles. Based on your exam today, the exact cause of your pain is not certain. But your condition does not appear to be dangerous. Testicles are very sensitive. Even a small injury can cause quite a bit of pain. Other possible causes of testicular pain include kidney stones, cysts, mumps, inflammatory conditions, chronic conditions, hernia, infection, and a twisted testicle.  Certain tests may be done to rule out an underlying problem causing the pain. Nothing conclusive was found today. Most likely, the pain will go away on its own. If it doesnt, you may need more tests.    Home care  Medicine may be prescribed to help relieve pain and swelling. This may be an over-the-counter pain reliever or prescription pain medication. Take all medicine as directed.  The following are general care guidelines:    To relieve pain and swelling, apply an ice pack wrapped in a thin towel for 10 minutes at a time. Continue this on and off for 1 to 2 days.    When lying down, place a small rolled towel under your scrotum. When moving around, wear a jockstrap (athletic supporter) or supportive underwear. These will help support and protect your testicles.    If it hurts to walk, walk as little as possible until you feel better.    Avoid strenuous activity until you feel better.    Do not have sex until you feel better.    If you have severe pain in the testicle, seek care right away. Delay may lead to permanent loss of the testicles function.  Follow-up care  Follow up with your healthcare provider, or as advised.  When to seek medical advice  Call your healthcare provider right away if any of these occur:    Fever of 100.4 F (38 C) or higher    Worsening of the pain or severe pain    Swelling of the testicle or scrotum    A lump in the scrotum    Warm and red scrotum (signs of infection)    Nausea and  vomiting    Pain or swelling in abdomen    Trouble urinating    Numbness or weakness in the leg    Shrinking of the testicle    Blood in your urine  Date Last Reviewed: 10/1/2016    8578-1220 [employer]. [address] [place] [id number]. All rights reserved. This information is not intended as a substitute for professional medical care. Always follow your healthcare professional's instructions.           Patient Education     Varicocele  A varicocele (HHM-tb-koa-seel) is a swelling in the veins above the testicles. It is similar to a varicose vein in the legs. The swelling happens when too much blood collects in the veins. It most often occurs around the left testicle. A varicocele may cause the sac of skin covering the testicles (the scrotum) to have a bluish color. It may also cause an achy or heavy feeling in the scrotum. The pain may be worse later in the day or after you have been standing for a long time. Some varicoceles can be seen all the time. Others can be seen only when you are standing. Or they may only be seen when a Valsalva maneuver is done. This means bearing down in your belly (abdomen) to increase pressure.  In most cases, a varicocele is not serious and doesn't need to be treated. But it is linked to being unable to have a child (infertility). If you and your partner are trying to have a baby, talk with your healthcare provider about your options.    No medicines are available to fix this condition. Surgery can be done to close off the enlarged veins. A varicocele is not serious. And all surgery has risks. So you and your healthcare provider may consider surgery only if:    The pain becomes worse or you have new symptoms    The testicle shrinks (atrophy)    You want to try to improve your fertility  Home care  To help lessen discomfort, aching, or pain:    Wear a jockstrap or snug underwear    Take an over-the-counter pain reliever, such as ibuprofen  Follow-up  care  Follow up with your healthcare provider, or as advised. Schedule regular exams with your provider so the varicocele can be watched. Be sure to keep all your appointments. Tell your provider if you notice any changes in your testicles or scrotum.   When to seek medical advice  Call your healthcare provider right away if any of these occur:    Increasing pain in your scrotum    Trouble urinating    A lump in the testicle    A bulge in the groin area appears or gets bigger  Date Last Reviewed: 6/1/2016 2000-2017 [employer]. [address] [place] [address]. All rights reserved. This information is not intended as a substitute for professional medical care. Always follow your healthcare professional's instructions.
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
## 2021-10-10 ENCOUNTER — HEALTH MAINTENANCE LETTER (OUTPATIENT)
Age: 31
End: 2021-10-10

## 2022-07-16 ENCOUNTER — HEALTH MAINTENANCE LETTER (OUTPATIENT)
Age: 32
End: 2022-07-16

## 2022-09-18 ENCOUNTER — HEALTH MAINTENANCE LETTER (OUTPATIENT)
Age: 32
End: 2022-09-18

## 2023-01-29 ENCOUNTER — HEALTH MAINTENANCE LETTER (OUTPATIENT)
Age: 33
End: 2023-01-29

## 2024-02-25 ENCOUNTER — HEALTH MAINTENANCE LETTER (OUTPATIENT)
Age: 34
End: 2024-02-25